# Patient Record
Sex: MALE | Race: BLACK OR AFRICAN AMERICAN | NOT HISPANIC OR LATINO | Employment: FULL TIME | URBAN - METROPOLITAN AREA
[De-identification: names, ages, dates, MRNs, and addresses within clinical notes are randomized per-mention and may not be internally consistent; named-entity substitution may affect disease eponyms.]

---

## 2023-02-18 ENCOUNTER — HOSPITAL ENCOUNTER (EMERGENCY)
Facility: OTHER | Age: 33
Discharge: HOME | End: 2023-02-18
Attending: EMERGENCY MEDICINE

## 2023-02-18 VITALS
SYSTOLIC BLOOD PRESSURE: 137 MMHG | OXYGEN SATURATION: 96 % | HEIGHT: 72 IN | RESPIRATION RATE: 17 BRPM | WEIGHT: 200 LBS | HEART RATE: 82 BPM | DIASTOLIC BLOOD PRESSURE: 89 MMHG | BODY MASS INDEX: 27.09 KG/M2 | TEMPERATURE: 98 F

## 2023-02-18 DIAGNOSIS — H00.014 HORDEOLUM EXTERNUM OF LEFT UPPER EYELID: Primary | ICD-10-CM

## 2023-02-18 PROCEDURE — 99282 EMERGENCY DEPT VISIT SF MDM: CPT

## 2023-02-18 NOTE — Clinical Note
"John"Genesis Jones was seen and treated in our emergency department on 2/18/2023.  He may return to work on 02/18/2023.       If you have any questions or concerns, please don't hesitate to call.      Phoebe Erickson PA-C"

## 2023-02-18 NOTE — ED PROVIDER NOTES
Source of History:  patient    Chief complaint:  Eye Problem (C/o stye to left eyelid x 3 days with no improvement. Denies any other symptoms. Swelling noted to left eyelid. VSS)      HPI:  John Jones is a 32 y.o. male presenting with complaints of stye to his left upper eyelid x3 days. Patient has been using warm compresses at home with no alleviation. There is associated redness and swelling to his eyelid.  Reports a history of styes.  He is an employee on a [employer]. Denies any vision changes.  Denies fever, chills, headache, sinus pain, or nasal congestion.    This is the extent to the patients complaints today here in the emergency department.    ROS: As per HPI and below:  General: No fever.  No chills.  Eyes: +stye to left eye  ENT: No sore throat. No ear pain.  Urinary: No abnormal urination.  MSK: No back pain. No joint pain.   Integument: No rashes or lesions.  Immune:  Not immunocompromised    Review of patient's allergies indicates:  No Known Allergies    PMH:  As per HPI and below:  No past medical history on file.  No past surgical history on file.         Physical Exam:    /89 (BP Location: Left arm, Patient Position: Sitting)   Pulse 82   Temp 98.2 °F (36.8 °C) (Oral)   Resp 17   Ht 6' (1.829 m)   Wt 90.7 kg (200 lb)   SpO2 96%   BMI 27.12 kg/m²   Nursing note and vital signs reviewed.  Appearance: No acute distress.  Eyes:  Redness and swelling noted to the external upper left eyelid.  No exudate or crusting noted.  No conjunctival injection bilaterally.  EOM intact.  PERRLA.  ENT: Normal phonation.  Musculoskeletal: Good range of motion all joints.  No deformities.  Neck supple.  No meningismus. Neurovascularly intact.  Skin: No rashes seen.  Good turgor.  No abrasions.  No ecchymoses.  Mental Status:  Alert and oriented x 3.  Appropriate, conversant.      MDM:    32 y.o. male with redness and swelling to left upper eyelid.  Patient is afebrile, nontoxic appearing and  hemodynamically stable.  Physical exam and history concerning for external hordeolum.    Differential diagnosis includes but is not limited to:  Hordeolum, chalazion, blepharitis, iritis, corneal abrasion    ED management  Patient educated on supportive care and symptomatic management of his hordeolum. There is no significant area of fluctuance or identifiable drainable area.  Instructed him on keeping his eyelid clean multiple times a day in addition to continued warm compresses.                 Diagnostic Impression:    1. Hordeolum externum of left upper eyelid         ED Disposition Condition    Discharge Stable            ED Prescriptions    None       Follow-up Information       Follow up With Specialties Details Why Contact Info    [facility] - Emergency Dept Emergency Medicine  If symptoms worsen [address] [id number] [phone]             Phoebe Erickson PA-C  02/18/23 1020